# Patient Record
Sex: MALE | Race: WHITE | NOT HISPANIC OR LATINO | Employment: FULL TIME | URBAN - METROPOLITAN AREA
[De-identification: names, ages, dates, MRNs, and addresses within clinical notes are randomized per-mention and may not be internally consistent; named-entity substitution may affect disease eponyms.]

---

## 2021-03-05 ENCOUNTER — IMMUNIZATION (OUTPATIENT)
Dept: VACCINE CLINIC | Facility: HOSPITAL | Age: 36
End: 2021-03-05

## 2021-03-05 PROCEDURE — 0001A: CPT | Performed by: INTERNAL MEDICINE

## 2021-03-05 PROCEDURE — 91300 HC SARSCOV02 VAC 30MCG/0.3ML IM: CPT | Performed by: INTERNAL MEDICINE

## 2021-03-29 ENCOUNTER — IMMUNIZATION (OUTPATIENT)
Dept: VACCINE CLINIC | Facility: HOSPITAL | Age: 36
End: 2021-03-29

## 2021-03-29 PROCEDURE — 0002A: CPT | Performed by: INTERNAL MEDICINE

## 2021-03-29 PROCEDURE — 91300 HC SARSCOV02 VAC 30MCG/0.3ML IM: CPT | Performed by: INTERNAL MEDICINE

## 2021-11-16 ENCOUNTER — OFFICE VISIT (OUTPATIENT)
Dept: INTERNAL MEDICINE | Facility: CLINIC | Age: 36
End: 2021-11-16

## 2021-11-16 ENCOUNTER — LAB (OUTPATIENT)
Dept: LAB | Facility: HOSPITAL | Age: 36
End: 2021-11-16

## 2021-11-16 VITALS
OXYGEN SATURATION: 99 % | RESPIRATION RATE: 18 BRPM | WEIGHT: 183.8 LBS | SYSTOLIC BLOOD PRESSURE: 110 MMHG | BODY MASS INDEX: 24.89 KG/M2 | HEIGHT: 72 IN | HEART RATE: 64 BPM | TEMPERATURE: 96.4 F | DIASTOLIC BLOOD PRESSURE: 80 MMHG

## 2021-11-16 DIAGNOSIS — Z00.00 ANNUAL PHYSICAL EXAM: Primary | ICD-10-CM

## 2021-11-16 DIAGNOSIS — Z11.59 ENCOUNTER FOR HEPATITIS C SCREENING TEST FOR LOW RISK PATIENT: ICD-10-CM

## 2021-11-16 DIAGNOSIS — Z00.00 ANNUAL PHYSICAL EXAM: ICD-10-CM

## 2021-11-16 DIAGNOSIS — Z23 NEED FOR VACCINATION: ICD-10-CM

## 2021-11-16 LAB
ALBUMIN SERPL-MCNC: 4.9 G/DL (ref 3.5–5.2)
ALBUMIN/GLOB SERPL: 1.5 G/DL
ALP SERPL-CCNC: 80 U/L (ref 39–117)
ALT SERPL W P-5'-P-CCNC: 136 U/L (ref 1–41)
ANION GAP SERPL CALCULATED.3IONS-SCNC: 10.8 MMOL/L (ref 5–15)
AST SERPL-CCNC: 48 U/L (ref 1–40)
BILIRUB SERPL-MCNC: 0.7 MG/DL (ref 0–1.2)
BUN SERPL-MCNC: 14 MG/DL (ref 6–20)
BUN/CREAT SERPL: 16.5 (ref 7–25)
CALCIUM SPEC-SCNC: 10 MG/DL (ref 8.6–10.5)
CHLORIDE SERPL-SCNC: 100 MMOL/L (ref 98–107)
CHOLEST SERPL-MCNC: 166 MG/DL (ref 0–200)
CO2 SERPL-SCNC: 27.2 MMOL/L (ref 22–29)
CREAT SERPL-MCNC: 0.85 MG/DL (ref 0.76–1.27)
DEPRECATED RDW RBC AUTO: 40.1 FL (ref 37–54)
ERYTHROCYTE [DISTWIDTH] IN BLOOD BY AUTOMATED COUNT: 12.7 % (ref 12.3–15.4)
GFR SERPL CREATININE-BSD FRML MDRD: 102 ML/MIN/1.73
GLOBULIN UR ELPH-MCNC: 3.2 GM/DL
GLUCOSE SERPL-MCNC: 79 MG/DL (ref 65–99)
HCT VFR BLD AUTO: 44.5 % (ref 37.5–51)
HDLC SERPL-MCNC: 57 MG/DL (ref 40–60)
HGB BLD-MCNC: 15.2 G/DL (ref 13–17.7)
LDLC SERPL CALC-MCNC: 97 MG/DL (ref 0–100)
LDLC/HDLC SERPL: 1.71 {RATIO}
MCH RBC QN AUTO: 29.6 PG (ref 26.6–33)
MCHC RBC AUTO-ENTMCNC: 34.2 G/DL (ref 31.5–35.7)
MCV RBC AUTO: 86.7 FL (ref 79–97)
PLATELET # BLD AUTO: 231 10*3/MM3 (ref 140–450)
PMV BLD AUTO: 11.9 FL (ref 6–12)
POTASSIUM SERPL-SCNC: 4.3 MMOL/L (ref 3.5–5.2)
PROT SERPL-MCNC: 8.1 G/DL (ref 6–8.5)
RBC # BLD AUTO: 5.13 10*6/MM3 (ref 4.14–5.8)
SODIUM SERPL-SCNC: 138 MMOL/L (ref 136–145)
TRIGL SERPL-MCNC: 58 MG/DL (ref 0–150)
VLDLC SERPL-MCNC: 12 MG/DL (ref 5–40)
WBC # BLD AUTO: 8.63 10*3/MM3 (ref 3.4–10.8)

## 2021-11-16 PROCEDURE — 80053 COMPREHEN METABOLIC PANEL: CPT | Performed by: NURSE PRACTITIONER

## 2021-11-16 PROCEDURE — 86803 HEPATITIS C AB TEST: CPT | Performed by: NURSE PRACTITIONER

## 2021-11-16 PROCEDURE — 90715 TDAP VACCINE 7 YRS/> IM: CPT | Performed by: NURSE PRACTITIONER

## 2021-11-16 PROCEDURE — 80061 LIPID PANEL: CPT | Performed by: NURSE PRACTITIONER

## 2021-11-16 PROCEDURE — 99385 PREV VISIT NEW AGE 18-39: CPT | Performed by: NURSE PRACTITIONER

## 2021-11-16 PROCEDURE — 85027 COMPLETE CBC AUTOMATED: CPT | Performed by: NURSE PRACTITIONER

## 2021-11-16 PROCEDURE — 90471 IMMUNIZATION ADMIN: CPT | Performed by: NURSE PRACTITIONER

## 2021-11-16 PROCEDURE — 84443 ASSAY THYROID STIM HORMONE: CPT | Performed by: NURSE PRACTITIONER

## 2021-11-16 NOTE — PROGRESS NOTES
Patient Care Team:  Марина Molina APRN as PCP - General (Nurse Practitioner)           Chief Complaint   Patient presents with   • Establish Care   • Annual Exam     HPI   Diogenes is a 36-year-old male who presents today to establish care and for an annual exam.  He reports that he went to urgent care yesterday for some dizziness.  He became dizzy when playing flag football and had mild shortness of breath.  They evaluated him and recommend he increase his fluid intake.  He has done this and symptoms have fully resolved.  He has no personal or familial history of cardiac disease.  Thinks that a grandparent might have had a heart attack but he is not sure.  He is usually active 6-7 days a week and has never had this happen before.       Health maintenance/lifestyle:  Health Maintenance   Topic Date Due   • ANNUAL PHYSICAL  Never done   • TDAP/TD VACCINES (1 - Tdap) Never done   • INFLUENZA VACCINE  Never done   • HEPATITIS C SCREENING  Never done   • COVID-19 Vaccine  Completed   • Pneumococcal Vaccine 0-64  Aged Out       Immunization History   Administered Date(s) Administered   • COVID-19 (PFIZER) 03/05/2021, 03/29/2021   • Tdap 11/16/2021       Covid vaccine: Completed 2 doses of Pfizer  Influenza: Due, counseled, declined  Tetanus: Due, counseled, will update today    Hep C screening: Never, denies high risk behaviors, will screen today    Cancer-related family history is negative for Prostate cancer and Colon cancer.       reports being sexually active and has had partner(s) who are female. He reports using the following method of birth control/protection: Condom.  STI concerns: denies    Eye exam: due, advised to schedule   Dental exam: due, advised to schedule   Sleep: ok, about 6-8 h per night    Diet: healthy for the  Most paart, meal preps.   Exercise: 6-7 days a week - flag football, vollyball.       Social History     Tobacco Use   Smoking Status Never Smoker   Smokeless Tobacco Never Used      Social History     Substance and Sexual Activity   Alcohol Use Yes    Comment: 1-2 drinks per week at most.        PHQ-2 Depression Screening  Little interest or pleasure in doing things? 0   Feeling down, depressed, or hopeless? 0   PHQ-2 Total Score 0           Review of Systems   Constitutional: Negative for activity change, appetite change, chills, diaphoresis, fatigue, fever, unexpected weight gain and unexpected weight loss.   HENT: Positive for postnasal drip. Negative for voice change.    Eyes: Negative for blurred vision, double vision, pain and visual disturbance.   Respiratory: Negative for cough, chest tightness, shortness of breath and wheezing.    Cardiovascular: Negative for chest pain, palpitations and leg swelling.   Gastrointestinal: Negative for abdominal distention, abdominal pain, constipation, diarrhea, nausea and vomiting.   Endocrine: Negative for cold intolerance, heat intolerance, polydipsia, polyphagia and polyuria.   Genitourinary: Negative for difficulty urinating, frequency and urgency.   Musculoskeletal: Negative for arthralgias and myalgias.   Skin: Negative for color change, dry skin and rash.   Neurological: Positive for dizziness (resolved). Negative for facial asymmetry, weakness, light-headedness, numbness, headache and confusion.   Hematological: Negative for adenopathy. Does not bruise/bleed easily.   Psychiatric/Behavioral: Negative for decreased concentration and sleep disturbance. The patient is not nervous/anxious.          History  Social History     Socioeconomic History   • Marital status: Single   • Number of children: 0   • Highest education level: Bachelor's degree (e.g., BA, AB, BS)   Tobacco Use   • Smoking status: Never Smoker   • Smokeless tobacco: Never Used   Vaping Use   • Vaping Use: Never used   Substance and Sexual Activity   • Alcohol use: Yes     Comment: 1-2 drinks per week at most.    • Drug use: Never   • Sexual activity: Yes     Partners: Female     " Birth control/protection: Condom     History reviewed. No pertinent past medical history.   Past Surgical History:   Procedure Laterality Date   • NO PAST SURGERIES        No Known Allergies   Family History   Problem Relation Age of Onset   • Kidney failure Maternal Grandmother    • Heart attack Maternal Grandfather    • Prostate cancer Neg Hx    • Colon cancer Neg Hx      No current outpatient medications on file.                  /80   Pulse 64   Temp 96.4 °F (35.8 °C)   Resp 18   Ht 182.9 cm (72.01\")   Wt 83.4 kg (183 lb 12.8 oz)   SpO2 99%   BMI 24.92 kg/m²       Physical Exam  Vitals and nursing note reviewed.   Constitutional:       General: He is not in acute distress.     Appearance: Normal appearance. He is well-developed. He is not diaphoretic.   HENT:      Head: Normocephalic and atraumatic.      Right Ear: External ear normal.      Left Ear: External ear normal.      Nose: Nose normal.   Eyes:      General: No scleral icterus.        Right eye: No discharge.         Left eye: No discharge.      Conjunctiva/sclera: Conjunctivae normal.      Pupils: Pupils are equal, round, and reactive to light.   Neck:      Thyroid: No thyromegaly.      Vascular: No JVD.      Trachea: No tracheal deviation.   Cardiovascular:      Rate and Rhythm: Normal rate and regular rhythm.      Pulses:           Dorsalis pedis pulses are 2+ on the right side and 2+ on the left side.        Posterior tibial pulses are 2+ on the right side and 2+ on the left side.      Heart sounds: Normal heart sounds. No murmur heard.  No friction rub. No gallop.    Pulmonary:      Effort: Pulmonary effort is normal. No respiratory distress.      Breath sounds: Normal breath sounds. No wheezing or rales.   Chest:      Chest wall: No tenderness.   Abdominal:      General: Bowel sounds are normal. There is no distension.      Palpations: Abdomen is soft. There is no mass.      Tenderness: There is no abdominal tenderness. There is no " guarding or rebound.      Hernia: No hernia is present.   Genitourinary:     Comments: deferred  Musculoskeletal:         General: No tenderness or deformity. Normal range of motion.      Cervical back: Normal range of motion and neck supple.   Lymphadenopathy:      Cervical: No cervical adenopathy.   Skin:     General: Skin is warm and dry.      Coloration: Skin is not pale.      Findings: No erythema or rash.   Neurological:      Mental Status: He is alert and oriented to person, place, and time.      Motor: No abnormal muscle tone.      Coordination: Coordination normal.      Deep Tendon Reflexes: Reflexes are normal and symmetric. Reflexes normal.   Psychiatric:         Speech: Speech normal.         Behavior: Behavior normal.         Thought Content: Thought content normal.         Judgment: Judgment normal.                   Diagnoses and all orders for this visit:    1. Annual physical exam (Primary)  -     HCV Antibody Rfx To Qnt PCR; Future  -     Tdap Vaccine Greater Than or Equal To 8yo IM  -     CBC (No Diff); Future  -     Comprehensive Metabolic Panel; Future  -     Lipid Panel; Future  -     TSH Rfx On Abnormal To Free T4; Future    2. Need for vaccination  -     Tdap Vaccine Greater Than or Equal To 8yo IM    3. Encounter for hepatitis C screening test for low risk patient  -     HCV Antibody Rfx To Qnt PCR; Future         Labs  ordered as above- will notify of results and treat accordingly. If patient has not received results within one week via mychart or letter, they will notify my office  Immunizations and screenings: Preventative/screenings are up-to-date/addressed as noted in the HPI.  Counseling: The patient is advised to continue current healthy lifestyle patterns and return for routine annual checkups  Follow up: Return in about 1 year (around 11/16/2022) for Annual, collect labs today.  Plan of care discussed with pt. They verbalized understanding and agreement.     MARTY Erickson    11/16/2021   13:50 EST              Dictated Utilizing Dragon Dictation   Please note that portions of this note were completed with a voice recognition program.   Part of this note may be an electronic transcription/translation of spoken language to printed text using the Dragon Dictation System.

## 2021-11-17 LAB
HCV AB S/CO SERPL IA: <0.1 S/CO RATIO (ref 0–0.9)
HCV AB SERPL QL IA: NORMAL
TSH SERPL DL<=0.05 MIU/L-ACNC: 1.75 UIU/ML (ref 0.27–4.2)

## 2021-11-24 DIAGNOSIS — R79.89 ELEVATED LFTS: Primary | ICD-10-CM

## 2021-11-26 ENCOUNTER — TELEPHONE (OUTPATIENT)
Dept: INTERNAL MEDICINE | Facility: CLINIC | Age: 36
End: 2021-11-26

## 2021-11-26 NOTE — TELEPHONE ENCOUNTER
----- Message from MARTY Hyman sent at 11/24/2021  5:06 PM EST -----  Please let him know that his labs show that he does have an elevation in his liver function tests.  I would recommend that he stop alcohol consumption, make sure he is not taking excessive amounts of over-the-counter Tylenol, and have him stop and in the next 2 weeks to repeat this blood work. We may need to get an US of the liver as well but will look at the next labs first    His other labs were in acceptable ranges

## 2022-11-18 ENCOUNTER — LAB (OUTPATIENT)
Dept: LAB | Facility: HOSPITAL | Age: 37
End: 2022-11-18

## 2022-11-18 ENCOUNTER — OFFICE VISIT (OUTPATIENT)
Dept: INTERNAL MEDICINE | Facility: CLINIC | Age: 37
End: 2022-11-18

## 2022-11-18 VITALS
HEIGHT: 72 IN | HEART RATE: 52 BPM | OXYGEN SATURATION: 98 % | WEIGHT: 180.8 LBS | BODY MASS INDEX: 24.49 KG/M2 | DIASTOLIC BLOOD PRESSURE: 68 MMHG | RESPIRATION RATE: 16 BRPM | SYSTOLIC BLOOD PRESSURE: 120 MMHG | TEMPERATURE: 97.8 F

## 2022-11-18 DIAGNOSIS — R79.89 ELEVATED LFTS: ICD-10-CM

## 2022-11-18 DIAGNOSIS — Z00.00 ANNUAL PHYSICAL EXAM: ICD-10-CM

## 2022-11-18 DIAGNOSIS — Z00.00 ANNUAL PHYSICAL EXAM: Primary | ICD-10-CM

## 2022-11-18 LAB
ALBUMIN SERPL-MCNC: 4.4 G/DL (ref 3.5–5.2)
ALBUMIN/GLOB SERPL: 1.6 G/DL
ALP SERPL-CCNC: 70 U/L (ref 39–117)
ALT SERPL W P-5'-P-CCNC: 36 U/L (ref 1–41)
ANION GAP SERPL CALCULATED.3IONS-SCNC: 11.8 MMOL/L (ref 5–15)
AST SERPL-CCNC: 41 U/L (ref 1–40)
BILIRUB SERPL-MCNC: 0.7 MG/DL (ref 0–1.2)
BUN SERPL-MCNC: 17 MG/DL (ref 6–20)
BUN/CREAT SERPL: 16.3 (ref 7–25)
CALCIUM SPEC-SCNC: 10.3 MG/DL (ref 8.6–10.5)
CHLORIDE SERPL-SCNC: 103 MMOL/L (ref 98–107)
CO2 SERPL-SCNC: 24.2 MMOL/L (ref 22–29)
CREAT SERPL-MCNC: 1.04 MG/DL (ref 0.76–1.27)
DEPRECATED RDW RBC AUTO: 40.3 FL (ref 37–54)
EGFRCR SERPLBLD CKD-EPI 2021: 94.8 ML/MIN/1.73
ERYTHROCYTE [DISTWIDTH] IN BLOOD BY AUTOMATED COUNT: 12.3 % (ref 12.3–15.4)
GLOBULIN UR ELPH-MCNC: 2.7 GM/DL
GLUCOSE SERPL-MCNC: 83 MG/DL (ref 65–99)
HCT VFR BLD AUTO: 39.7 % (ref 37.5–51)
HGB BLD-MCNC: 13.3 G/DL (ref 13–17.7)
MCH RBC QN AUTO: 29.7 PG (ref 26.6–33)
MCHC RBC AUTO-ENTMCNC: 33.5 G/DL (ref 31.5–35.7)
MCV RBC AUTO: 88.6 FL (ref 79–97)
PLATELET # BLD AUTO: 205 10*3/MM3 (ref 140–450)
PMV BLD AUTO: 11.3 FL (ref 6–12)
POTASSIUM SERPL-SCNC: 4.4 MMOL/L (ref 3.5–5.2)
PROT SERPL-MCNC: 7.1 G/DL (ref 6–8.5)
RBC # BLD AUTO: 4.48 10*6/MM3 (ref 4.14–5.8)
SODIUM SERPL-SCNC: 139 MMOL/L (ref 136–145)
WBC NRBC COR # BLD: 7.25 10*3/MM3 (ref 3.4–10.8)

## 2022-11-18 PROCEDURE — 99395 PREV VISIT EST AGE 18-39: CPT | Performed by: NURSE PRACTITIONER

## 2022-11-18 PROCEDURE — 85027 COMPLETE CBC AUTOMATED: CPT | Performed by: NURSE PRACTITIONER

## 2022-11-18 PROCEDURE — 80053 COMPREHEN METABOLIC PANEL: CPT | Performed by: NURSE PRACTITIONER

## 2022-11-18 NOTE — PROGRESS NOTES
Patient Care Team:  Марина Molina APRN as PCP - General (Nurse Practitioner)     Chief complaint: Patient is in today for a physical          Patient is a 37 y.o. male who presents for his yearly physical exam.     HPI      No acute complaints.    He did have elevated LFTs on his annual last year.  This was felt to be related to some alcohol use.  He rarely drinks alcohol now.  He does report that he drank heavily in the past.  He has no jaundice, scleral icterus, abdominal pain, melena, hematochezia.    Health maintenance/lifestyle:  Health Maintenance   Topic Date Due   • COVID-19 Vaccine (4 - Booster for Pfizer series) 11/20/2022 (Originally 1/17/2022)   • INFLUENZA VACCINE  03/31/2023 (Originally 8/1/2022)   • ANNUAL PHYSICAL  11/19/2023   • TDAP/TD VACCINES (2 - Td or Tdap) 11/16/2031   • HEPATITIS C SCREENING  Completed   • Pneumococcal Vaccine 0-64  Aged Out       Immunization History   Administered Date(s) Administered   • COVID-19 (PFIZER) PURPLE CAP 03/05/2021, 03/29/2021, 11/22/2021   • Tdap 11/16/2021       Covid vaccine: Due-declined.  Counseled  Influenza: due, declined/counseled   Tetanus: Up-to-date, due 2031  Hep C screening: -2021.  Denies high risk behaviors.    Cancer-related family history is negative for Prostate cancer and Colon cancer.    Colon cancer screening: Recommend starting at age 45     reports being sexually active and has had partner(s) who are female. He reports using the following method of birth control/protection: Condom.  STI concerns: No  Diet: Healthy for the most part.  Does do meal prepping.  Exercise: Stays very active 6-7 days a week.  Flag football, volleyball, hockey    Social History     Tobacco Use   Smoking Status Never   Smokeless Tobacco Never     Social History     Substance and Sexual Activity   Alcohol Use Yes    Comment: 1-2 drinks per week at most.        PHQ-2 Depression Screening  Little interest or pleasure in doing things? 0-->not at all   Feeling  down, depressed, or hopeless? 0-->not at all   PHQ-2 Total Score 0       Review of Systems   Constitutional: Negative for activity change, appetite change, chills, diaphoresis, fatigue, fever, unexpected weight gain and unexpected weight loss.   HENT: Negative for voice change.    Eyes: Negative for blurred vision, double vision, pain and visual disturbance.   Respiratory: Negative for cough, chest tightness, shortness of breath and wheezing.    Cardiovascular: Negative for chest pain, palpitations and leg swelling.   Gastrointestinal: Negative for abdominal distention, abdominal pain, constipation, diarrhea, nausea and vomiting.   Endocrine: Negative for cold intolerance, heat intolerance, polydipsia, polyphagia and polyuria.   Genitourinary: Negative for difficulty urinating, frequency and urgency.   Musculoskeletal: Positive for arthralgias and myalgias.   Skin: Negative for color change, dry skin and rash.   Neurological: Negative for dizziness, facial asymmetry, weakness, light-headedness, numbness, headache and confusion.   Hematological: Negative for adenopathy. Does not bruise/bleed easily.   Psychiatric/Behavioral: Negative for decreased concentration and sleep disturbance. The patient is not nervous/anxious.          History  Social History     Socioeconomic History   • Marital status: Single   • Number of children: 0   • Highest education level: Bachelor's degree (e.g., BA, AB, BS)   Tobacco Use   • Smoking status: Never   • Smokeless tobacco: Never   Vaping Use   • Vaping Use: Never used   Substance and Sexual Activity   • Alcohol use: Yes     Comment: 1-2 drinks per week at most.    • Drug use: Never   • Sexual activity: Yes     Partners: Female     Birth control/protection: Condom     History reviewed. No pertinent past medical history.   Past Surgical History:   Procedure Laterality Date   • NO PAST SURGERIES        No Known Allergies   Family History   Problem Relation Age of Onset   • Kidney failure  "Maternal Grandmother    • Heart attack Maternal Grandfather    • Prostate cancer Neg Hx    • Colon cancer Neg Hx      No current outpatient medications on file.                  /68   Pulse 52   Temp 97.8 °F (36.6 °C) (Infrared)   Resp 16   Ht 183.5 cm (72.24\")   Wt 82 kg (180 lb 12.8 oz)   SpO2 98%   BMI 24.36 kg/m²       Physical Exam  Vitals and nursing note reviewed.   Constitutional:       General: He is not in acute distress.     Appearance: Normal appearance. He is well-developed. He is not diaphoretic.   HENT:      Head: Normocephalic and atraumatic.      Right Ear: External ear normal.      Left Ear: External ear normal.      Nose: Nose normal.   Eyes:      General: No scleral icterus.        Right eye: No discharge.         Left eye: No discharge.      Conjunctiva/sclera: Conjunctivae normal.      Pupils: Pupils are equal, round, and reactive to light.   Neck:      Thyroid: No thyromegaly.      Vascular: No carotid bruit or JVD.      Trachea: No tracheal deviation.   Cardiovascular:      Rate and Rhythm: Normal rate and regular rhythm.      Heart sounds: Normal heart sounds. No murmur heard.    No friction rub. No gallop.   Pulmonary:      Effort: Pulmonary effort is normal. No respiratory distress.      Breath sounds: Normal breath sounds. No wheezing or rales.   Chest:      Chest wall: No tenderness.   Abdominal:      General: Bowel sounds are normal. There is no distension.      Palpations: Abdomen is soft. There is no hepatomegaly or mass.      Tenderness: There is no abdominal tenderness. There is no guarding or rebound.      Hernia: No hernia is present.   Genitourinary:     Comments: deferred  Musculoskeletal:         General: No tenderness or deformity. Normal range of motion.      Cervical back: Normal range of motion and neck supple.   Lymphadenopathy:      Cervical: No cervical adenopathy.   Skin:     General: Skin is warm and dry.      Coloration: Skin is not pale.      Findings: " No erythema or rash.   Neurological:      General: No focal deficit present.      Mental Status: He is alert and oriented to person, place, and time.      Motor: No abnormal muscle tone.   Psychiatric:         Speech: Speech normal.         Behavior: Behavior normal.         Thought Content: Thought content normal.         Judgment: Judgment normal.                   Diagnoses and all orders for this visit:    1. Annual physical exam (Primary)  -     CBC (No Diff); Future  -     Comprehensive Metabolic Panel; Future    2. Elevated LFTs  -     Comprehensive Metabolic Panel; Future  Recheck LFTs.  Consider ultrasound if remains elevated     Labs  ordered as above- will notify of results and treat accordingly. If patient has not received results within one week via mychart or letter, they will notify my office  Immunizations and screenings:  preventative/screenings are up-to-date/addressed as noted in the HPI.  Counseling: The patient is advised to continue current healthy lifestyle patterns and return for routine annual checkups  Health Maintenance reviewed.    Follow up: Return in about 1 year (around 11/18/2023) for Annual, and need to collect labs today.  Plan of care discussed with pt. They verbalized understanding and agreement.     Electronically signed by : MARTY Erickson   11/18/2022   10:50 EST              Dictated Utilizing Dragon Dictation   Please note that portions of this note were completed with a voice recognition program.   Part of this note may be an electronic transcription/translation of spoken language to printed text using the Dragon Dictation System.

## 2023-12-08 ENCOUNTER — OFFICE VISIT (OUTPATIENT)
Dept: INTERNAL MEDICINE | Facility: CLINIC | Age: 38
End: 2023-12-08
Payer: COMMERCIAL

## 2023-12-08 VITALS
RESPIRATION RATE: 16 BRPM | HEIGHT: 72 IN | HEART RATE: 68 BPM | SYSTOLIC BLOOD PRESSURE: 126 MMHG | WEIGHT: 184.6 LBS | BODY MASS INDEX: 25 KG/M2 | TEMPERATURE: 98 F | OXYGEN SATURATION: 99 % | DIASTOLIC BLOOD PRESSURE: 72 MMHG

## 2023-12-08 DIAGNOSIS — Z00.00 ANNUAL PHYSICAL EXAM: Primary | ICD-10-CM

## 2023-12-08 DIAGNOSIS — R79.89 ELEVATED LFTS: ICD-10-CM

## 2023-12-08 PROBLEM — B00.1 RECURRENT COLD SORES: Status: ACTIVE | Noted: 2023-12-08

## 2023-12-08 PROCEDURE — 99395 PREV VISIT EST AGE 18-39: CPT | Performed by: NURSE PRACTITIONER

## 2023-12-08 RX ORDER — VALACYCLOVIR HYDROCHLORIDE 1 G/1
TABLET, FILM COATED ORAL
COMMUNITY
Start: 2023-08-18

## 2023-12-08 NOTE — PROGRESS NOTES
"  Patient Care Team:  Марина Molina APRN as PCP - General (Nurse Practitioner)     Chief Complaint   Patient presents with    Annual Exam             Patient is a 38 y.o. male who presents for his yearly physical exam.     HPI      The patient is a 38-year-old male who is presents today for annual examination.     He has a history of elevated liver function tests for the last 2 years. On 11/16/2021, ALT was 136 units/L, and AST was 48 units/L. On 11/18/2022, ALT decreased to 36 units/L, and AST decreased to 41 units/L though remained slightly elevated. He has not had a liver ultrasound. He tested negative for hepatitis C in 2021. He denies any high-risk behaviors such as multiple sexual partners, blood/bodily fluid exposure, or illicit substance use.    The patient takes valacyclovir for cold sores and does not require a refill at this time. He has not developed a cold sore in nearly 2 years.     He denies any abdominal pain, nausea, emesis, change in the color of his urine or bowel movements, urinary urgency/frequency, dysuria, constipation, diarrhea, chest pain, dyspnea, headaches, or dizziness. After denying all, patient states \"what you mentioned is it's you know I might get something from like working out too hard or something like that, but it's not a consistent thing...It's the same thing with your urine and like I might not be drinking enough water.\"  He developed a blister on his right thumb while weightlifting on 12/07/2023.    He denies any changes in his medical history since his last visit.     The patient is not fasting today.    He consumes alcohol socially, usually 1 to 2 drinks at a time though more on special occasions. He consumes less alcohol currently than in the past.    Health maintenance/lifestyle:  Health Maintenance   Topic Date Due    ANNUAL PHYSICAL  11/18/2023    COVID-19 Vaccine (4 - 2023-24 season) 12/10/2023 (Originally 9/1/2023)    INFLUENZA VACCINE  03/31/2024 (Originally " 8/1/2023)    TDAP/TD VACCINES (2 - Td or Tdap) 11/16/2031    HEPATITIS C SCREENING  Completed    Pneumococcal Vaccine 0-64  Aged Out     Immunization History   Administered Date(s) Administered    COVID-19 (PFIZER) Purple Cap Monovalent 03/05/2021, 03/29/2021, 11/22/2021    Tdap 11/16/2021     Cancer-related family history is negative for Prostate cancer and Colon cancer.   reports being sexually active and has had partner(s) who are female. He reports using the following method of birth control/protection: Condom.  Social History     Tobacco Use   Smoking Status Never   Smokeless Tobacco Never     Social History     Substance and Sexual Activity   Alcohol Use Yes    Comment: 1-2 drinks per week at most.        Covid vaccine: Declines today.  Pneumococcal:   Influenza: Declines today.  Tetanus: Due in 2031.  Zoster:   Hep A:   Hep B:   Hep C screening: Negative in 2021.      Lung cancer screening:   Dexa:   PSA: Due at age 50 years.  Colon cancer screening: Due at age 45 years.  STI concerns:     Eye exam: The patient not undergone vision screening recently.  Dental exam: He underwent routine dental cleaning in the last 6 months.      Sunscreen use:   Seatbelt use:     Diet: He eats a healthy diet aside from special occasions.   Exercise: The patient exercises 5 to 7 days per week including playing ice hockey.    PHQ-2 Depression Screening  Little interest or pleasure in doing things?     Feeling down, depressed, or hopeless?     PHQ-2 Total Score           Review of Systems   Constitutional:  Negative for fatigue, fever and unexpected weight loss.   Eyes:  Negative for blurred vision, double vision and visual disturbance.   Respiratory:  Negative for cough, shortness of breath and wheezing.    Cardiovascular:  Negative for chest pain, palpitations and leg swelling.   Gastrointestinal:  Negative for abdominal pain, constipation, diarrhea, nausea and vomiting.   Genitourinary:  Negative for difficulty urinating,  "frequency and urgency.        Negative for change in the color of urine or bowel movements.   Musculoskeletal:  Negative for arthralgias and myalgias.   Skin:  Negative for color change and rash.   Neurological:  Negative for dizziness, weakness and headache.   Hematological:  Negative for adenopathy. Does not bruise/bleed easily.         History  Social History     Socioeconomic History    Marital status: Single    Number of children: 0    Highest education level: Bachelor's degree (e.g., BA, AB, BS)   Tobacco Use    Smoking status: Never    Smokeless tobacco: Never   Vaping Use    Vaping Use: Never used   Substance and Sexual Activity    Alcohol use: Yes     Comment: 1-2 drinks per week at most.     Drug use: Never    Sexual activity: Yes     Partners: Female     Birth control/protection: Condom     History reviewed. No pertinent past medical history.   Past Surgical History:   Procedure Laterality Date    NO PAST SURGERIES        No Known Allergies   Family History   Problem Relation Age of Onset    Kidney failure Maternal Grandmother     Heart attack Maternal Grandfather     Prostate cancer Neg Hx     Colon cancer Neg Hx        Current Outpatient Medications:     valACYclovir (VALTREX) 1000 MG tablet, , Disp: , Rfl:                   /72 (BP Location: Right arm, Patient Position: Sitting, Cuff Size: Adult)   Pulse 68   Temp 98 °F (36.7 °C) (Infrared)   Resp 16   Ht 183.5 cm (72.25\")   Wt 83.7 kg (184 lb 9.6 oz)   SpO2 99%   BMI 24.86 kg/m²       Physical Exam  Vitals and nursing note reviewed.   Constitutional:       General: He is not in acute distress.     Appearance: Normal appearance. He is well-developed. He is not diaphoretic.   HENT:      Head: Normocephalic and atraumatic.      Right Ear: External ear normal.      Left Ear: External ear normal.      Nose: Nose normal.   Eyes:      General: No scleral icterus.        Right eye: No discharge.         Left eye: No discharge.      " Conjunctiva/sclera: Conjunctivae normal.      Pupils: Pupils are equal, round, and reactive to light.   Neck:      Thyroid: No thyromegaly.      Vascular: No JVD (no bruits).      Trachea: No tracheal deviation.   Cardiovascular:      Rate and Rhythm: Normal rate and regular rhythm.      Heart sounds: No murmur heard.     No friction rub. No gallop.   Pulmonary:      Effort: Pulmonary effort is normal. No respiratory distress.      Breath sounds: Normal breath sounds. No wheezing or rales.   Chest:      Chest wall: No tenderness.   Abdominal:      General: Bowel sounds are normal. There is no distension.      Palpations: Abdomen is soft. There is no mass.      Tenderness: There is no abdominal tenderness. There is no guarding or rebound.      Hernia: No hernia is present.   Genitourinary:     Comments: deferred  Musculoskeletal:         General: No tenderness or deformity. Normal range of motion.      Cervical back: Normal range of motion and neck supple.   Lymphadenopathy:      Cervical: No cervical adenopathy.   Skin:     General: Skin is warm and dry.      Coloration: Skin is not pale.      Findings: No erythema or rash.   Neurological:      Mental Status: He is alert and oriented to person, place, and time.      Motor: No abnormal muscle tone.      Deep Tendon Reflexes: Reflexes are normal and symmetric. Reflexes normal.   Psychiatric:         Behavior: Behavior normal.         Thought Content: Thought content normal.         Judgment: Judgment normal.                   Diagnoses and all orders for this visit:    1. Annual physical exam (Primary)  -     CBC (No Diff); Future  -     Comprehensive Metabolic Panel; Future  -     Lipid Panel; Future    2. Elevated LFTs  -     Comprehensive Metabolic Panel; Future      1. Annual physical examination  He will return to complete fasting laboratory studies including CMP. Vision screening at least every 2 years was recommended. He will follow up if he wishes to obtain  vaccines. The patient was encouraged to continue consuming a healthy diet/minimal alcohol and exercising. If his laboratory studies are normal, he will follow up annually or sooner as needed.    2. History of elevated liver functions tests  If his CMP demonstrates significantly elevated liver functions tests, an ultrasound of the liver will be recommended. Otherwise, liver functions tests will be monitored.    3. Cold sores     Labs  ordered as above- will notify of results and treat accordingly. If patient has not received results within one week via mychart or letter, they will notify my office  Immunizations and screenings:   Other preventative/screenings are up-to-date/addressed as noted in the HPI.  Counseling: The patient is advised to continue current healthy lifestyle patterns and return for routine annual checkups  Follow up: Return in about 1 year (around 12/8/2024) for Annual, and needs to return for labs within 1-2 weeks.  Plan of care discussed with pt. They verbalized understanding and agreement.     Electronically signed by : MARTY Erickson    12/8/2023   15:50 EST          Transcribed from ambient dictation for MARTY Erickson by Yun Vazquez.  12/08/23   13:36 EST    Patient or patient representative verbalized consent to the visit recording.  I have personally performed the services described in this document as transcribed by the above individual, and it is both accurate and complete.  MARTY Erickson  12/8/2023  15:49 EST

## 2024-12-13 ENCOUNTER — OFFICE VISIT (OUTPATIENT)
Dept: INTERNAL MEDICINE | Facility: CLINIC | Age: 39
End: 2024-12-13
Payer: COMMERCIAL

## 2024-12-13 VITALS
HEIGHT: 72 IN | TEMPERATURE: 98 F | HEART RATE: 52 BPM | WEIGHT: 194.6 LBS | SYSTOLIC BLOOD PRESSURE: 120 MMHG | RESPIRATION RATE: 12 BRPM | BODY MASS INDEX: 26.36 KG/M2 | DIASTOLIC BLOOD PRESSURE: 74 MMHG | OXYGEN SATURATION: 99 %

## 2024-12-13 DIAGNOSIS — Z00.00 ANNUAL PHYSICAL EXAM: ICD-10-CM

## 2024-12-13 DIAGNOSIS — B00.1 FEVER BLISTER: Primary | ICD-10-CM

## 2024-12-13 RX ORDER — VALACYCLOVIR HYDROCHLORIDE 1 G/1
2000 TABLET, FILM COATED ORAL 2 TIMES DAILY
Qty: 4 TABLET | Refills: 3 | Status: SHIPPED | OUTPATIENT
Start: 2024-12-13

## 2024-12-13 NOTE — PROGRESS NOTES
Patient Care Team:  Марина Molina APRN as PCP - General (Nurse Practitioner)     Chief Complaint   Patient presents with    Annual Exam             Patient is a 39 y.o. male who presents for his yearly physical exam.     HPI  History of Present Illness  The patient is a 39-year-old male who is here for his annual examination.    He maintains an active lifestyle, engaging in gym workouts 5 to 6 times per week and participating in hockey sessions once or twice weekly. His dietary habits are generally healthy, with a preference for home-cooked meals over dining out. He underwent a vision examination last year at a health fair organized by his workplace. He reports no concerns regarding his eyesight, as he is able to read most text without difficulty. He adheres to a regular dental cleaning schedule, with his most recent appointment occurring last month. He does not consult with a dermatologist and reports no skin-related issues such as changes in moles or lesions. He experiences dry skin, which he attributes to seasonal variations, and manages this with the application of lotions. He uses sunscreen during prolonged outdoor activities, particularly in the summer months. He has not experienced any recurrent cold sores for over a year, noting a decrease in frequency over time. He is uncertain if this improvement is related to stress levels. He is fasting today, having only consumed coffee this morning. He reports no symptoms of headaches, dizziness, chest pain, shortness of breath, abdominal pain, nausea, vomiting, heartburn, diarrhea, constipation, hematochezia, easy bleeding or bruising, excessive hunger, thirst, urination, or heat or cold intolerances. He occasionally consumes alcohol during social events, typically on weekends.    SOCIAL HISTORY  He works for [employer]. He does not smoke or use drugs. He drinks alcohol occasionally, usually on weekends or at social  events.    MEDICATIONS  Valacyclovir    IMMUNIZATIONS  He received the COVID-19 vaccine and the influenza vaccine.      Health maintenance/lifestyle:  Health Maintenance   Topic Date Due    COVID-19 Vaccine (4 - 2024-25 season) 09/01/2024    ANNUAL PHYSICAL  12/08/2024    INFLUENZA VACCINE  03/31/2025 (Originally 7/1/2024)    BMI FOLLOWUP  12/13/2025    TDAP/TD VACCINES (2 - Td or Tdap) 11/16/2031    HEPATITIS C SCREENING  Completed    Pneumococcal Vaccine 0-64  Aged Out     Immunization History   Administered Date(s) Administered    COVID-19 (PFIZER) Purple Cap Monovalent 03/05/2021, 03/29/2021, 11/22/2021    Tdap 11/16/2021     Cancer-related family history is negative for Prostate cancer and Colon cancer.   reports being sexually active and has had partner(s) who are female. He reports using the following method of birth control/protection: Birth control pill.  Social History     Tobacco Use   Smoking Status Never   Smokeless Tobacco Never     Social History     Substance and Sexual Activity   Alcohol Use Yes    Comment: 1-2 drinks per week at most.        Results    PHQ-2 Depression Screening  Little interest or pleasure in doing things? Not at all   Feeling down, depressed, or hopeless? Not at all   PHQ-2 Total Score 0               History  Social History     Socioeconomic History    Marital status: Single    Number of children: 0    Highest education level: Bachelor's degree (e.g., BA, AB, BS)   Tobacco Use    Smoking status: Never    Smokeless tobacco: Never   Vaping Use    Vaping status: Never Used   Substance and Sexual Activity    Alcohol use: Yes     Comment: 1-2 drinks per week at most.     Drug use: Never    Sexual activity: Yes     Partners: Female     Birth control/protection: Birth control pill     History reviewed. No pertinent past medical history.   Past Surgical History:   Procedure Laterality Date    NO PAST SURGERIES        No Known Allergies   Family History   Problem Relation Age of Onset     "Kidney failure Maternal Grandmother     Heart attack Maternal Grandfather     Prostate cancer Neg Hx     Colon cancer Neg Hx        Current Outpatient Medications:     valACYclovir (VALTREX) 1000 MG tablet, Take 2 tablets by mouth 2 (Two) Times a Day. For one day if needed for fever blister, Disp: 4 tablet, Rfl: 3                  /74 (BP Location: Left arm, Patient Position: Sitting, Cuff Size: Adult)   Pulse 52   Temp 98 °F (36.7 °C) (Infrared)   Resp 12   Ht 183.5 cm (72.25\")   Wt 88.3 kg (194 lb 9.6 oz)   SpO2 99%   BMI 26.21 kg/m²       Physical Exam  Vitals and nursing note reviewed.   Constitutional:       General: He is not in acute distress.     Appearance: Normal appearance. He is well-developed. He is not ill-appearing, toxic-appearing or diaphoretic.   HENT:      Head: Normocephalic and atraumatic.      Right Ear: Tympanic membrane and external ear normal.      Left Ear: Tympanic membrane and external ear normal.      Nose: Nose normal.      Mouth/Throat:      Lips: Pink. No lesions.      Mouth: Mucous membranes are moist.      Tongue: No lesions.      Palate: No mass.   Eyes:      General: Lids are normal. No scleral icterus.        Right eye: No discharge.         Left eye: No discharge.      Conjunctiva/sclera: Conjunctivae normal.      Pupils: Pupils are equal, round, and reactive to light.   Neck:      Thyroid: No thyroid mass, thyromegaly or thyroid tenderness.      Vascular: No carotid bruit or JVD.      Trachea: No tracheal deviation.   Cardiovascular:      Rate and Rhythm: Normal rate and regular rhythm.      Chest Wall: PMI is not displaced. No thrill.      Pulses: No decreased pulses.      Heart sounds: Normal heart sounds. No murmur heard.     No friction rub. No gallop.   Pulmonary:      Effort: Pulmonary effort is normal. No accessory muscle usage or respiratory distress.      Breath sounds: Normal breath sounds.   Chest:      Chest wall: No tenderness.   Abdominal:      " General: Bowel sounds are normal. There is no distension.      Palpations: Abdomen is soft. There is no mass.      Tenderness: There is no abdominal tenderness. There is no guarding or rebound.      Hernia: No hernia is present.   Genitourinary:     Comments: deferred  Musculoskeletal:         General: No tenderness or deformity. Normal range of motion.      Cervical back: Normal range of motion and neck supple.      Right lower leg: No edema.      Left lower leg: No edema.   Lymphadenopathy:      Cervical: No cervical adenopathy.   Skin:     General: Skin is warm and dry.      Capillary Refill: Capillary refill takes 2 to 3 seconds.      Coloration: Skin is not ashen, jaundiced, mottled or pale.      Findings: No erythema or rash.   Neurological:      General: No focal deficit present.      Mental Status: He is alert and oriented to person, place, and time.   Psychiatric:         Attention and Perception: Attention normal.         Mood and Affect: Mood normal.         Speech: Speech normal.         Behavior: Behavior normal. Behavior is cooperative.         Thought Content: Thought content normal.         Judgment: Judgment normal.                    Diagnoses and all orders for this visit:    1. Fever blister (Primary)  -     valACYclovir (VALTREX) 1000 MG tablet; Take 2 tablets by mouth 2 (Two) Times a Day. For one day if needed for fever blister  Dispense: 4 tablet; Refill: 3    2. Annual physical exam  -     CBC (No Diff); Future  -     Comprehensive Metabolic Panel; Future  -     Lipid Panel; Future        Assessment & Plan  1. Annual physical examination.  His BMI is slightly elevated at 26.2, but given his muscular physique, this is not a cause for concern. His liver function tests have shown improvement over the past 2 years, with only one parameter remaining slightly abnormal. He has been informed that these abnormalities could be attributed to dehydration or vigorous physical activity. He has been advised  to continue his healthy lifestyle habits, including regular exercise and a balanced diet. He has been advised to apply creams such as CeraVe, Aquaphor, or Eucerin for better moisturization of his skin. He has been encouraged to undergo vision examinations every 1 to 2 years, even in the absence of contact lens or glasses usage. A prescription for valacyclovir has been provided, with instructions to take 2 tablets twice daily for a single day. A comprehensive panel of laboratory tests has been ordered, including fasting blood work and liver function tests. He has been informed that his tetanus vaccine is due in 2031. Should his liver function tests indicate an upward trend, additional orders will be placed for subsequent rechecks.    Follow-up  The patient will follow up in 1 year.       Labs  ordered as above- will notify of results and treat accordingly. If patient has not received results within one week via mychart or letter, they will notify my office    Follow up: Return in about 1 year (around 12/13/2025) for Annual, and needs to return for labs within 1-2 weeks.  Plan of care discussed with pt. They verbalized understanding and agreement.     Electronically signed by : MARTY Erickson    12/20/2024   20:15 EST           Patient or patient representative verbalized consent for the use of Ambient Listening during the visit with  MARTY Erickson for chart documentation. 12/20/2024  20:16 EST